# Patient Record
Sex: MALE | Race: WHITE | NOT HISPANIC OR LATINO | Employment: FULL TIME | URBAN - METROPOLITAN AREA
[De-identification: names, ages, dates, MRNs, and addresses within clinical notes are randomized per-mention and may not be internally consistent; named-entity substitution may affect disease eponyms.]

---

## 2020-07-29 ENCOUNTER — VIRTUAL VISIT (OUTPATIENT)
Dept: FAMILY MEDICINE | Facility: OTHER | Age: 45
End: 2020-07-29
Payer: COMMERCIAL

## 2020-07-29 PROCEDURE — 99421 OL DIG E/M SVC 5-10 MIN: CPT | Performed by: PHYSICIAN ASSISTANT

## 2020-07-29 NOTE — PROGRESS NOTES
"Date: 2020 11:36:41  Clinician: Adrian Duffy  Clinician NPI: [id number]  Patient: Sukhjinder Singletary  Patient : 1975  Patient Address: [address] [place] [address] [place] [address]  Patient Phone: [phone]  Visit Protocol: URI  Patient Summary:  Sukhjinder is a 44 year old ( : 1975 ) male who initiated a Visit for COVID-19 (Coronavirus) evaluation and screening. When asked the question \"Please sign me up to receive news, health information and promotions from [employer].\", Sukhjinder responded \"No\".    Sukhjinder states his symptoms started gradually 5-6 days ago. After his symptoms started, they improved and then got worse again.   His symptoms consist of wheezing, nausea, diarrhea, myalgia, a sore throat, a cough, nasal congestion, rhinitis, malaise, a headache, and chills. Sukhjinder also feels feverish.   Symptom details     Nasal secretions: The color of his mucus is clear, green, and yellow.    Cough: Sukhjinder coughs every 5-10 minutes and his cough is more bothersome at night. Phlegm comes into his throat when he coughs. He believes his cough is caused by post-nasal drip. The color of the phlegm is green.     Sore throat: Sukhjinder reports having moderate throat pain (4-6 on a 10 point pain scale), does not have exudate on his tonsils, and can swallow liquids. The lymph nodes in his neck are not enlarged. A rash has not appeared on the skin since the sore throat started.     Temperature: His current temperature is 100.1 degrees Fahrenheit. Sukhjinder has had a temperature over 100 degrees Fahrenheit for 3-4 days.     Wheezing: Sukhjinder has not ever been diagnosed with asthma. Additional wheezing details as reported by the patient (free text): Every time I wheeze, I then cough up phlegm       Headache: He states the headache is moderate (4-6 on a 10 point pain scale).      Sukhjinder denies having teeth pain, ageusia, anosmia, facial pain or pressure, vomiting, ear pain, and enlarged lymph nodes. He also denies having recent " facial or sinus surgery in the past 60 days and taking antibiotic medication in the past month. He is not experiencing dyspnea.   Precipitating events  Within the past week, Sukhjinder has not been exposed to someone with strep throat. He has not recently been exposed to someone with influenza. Sukhjinder has not been in close contact with any high risk individuals.   Pertinent COVID-19 (Coronavirus) information  In the past 14 days, Sukhjinder has not worked in a congregate living setting.   He either works or volunteers as a healthcare worker or a , or works or volunteers in a healthcare facility. He does not provide direct patient care. Additional job details as reported by the patient (free text): "[employer] for [employer].  I work in the following hospitals on a regular basis.  [facility]   Sukhjinder also has not lived in a congregate living setting in the past 14 days. He lives with a healthcare worker.   Sukhjinder has had a close contact with a laboratory-confirmed COVID-19 patient within 14 days of symptom onset. He was exposed at his work. Additional information about contact with COVID-19 (Coronavirus) patient as reported by the patient (free text): Within the last two weeks   Pertinent medical history  Sukhjinder had 1 sinus infection within the past year.   Sukhjinder needs a return to work/school note.   Weight: 340 lbs   Sukhjinder smokes or uses smokeless tobacco.   Weight: 340 lbs    MEDICATIONS: No current medications, ALLERGIES: NKDA  Clinician Response:  Dear Sukhjinder,   Your symptoms show that you may have coronavirus (COVID-19). This illness can cause fever, cough and trouble breathing. Many people get a mild case and get better on their own. Some people can get very sick.  What should I do?  We would like to test you for this virus.   1. Please call [phone] to schedule your visit. Explain that you were  "referred by [facility] to have a COVID-19 test. Be ready to share your [facility] visit ID number.  The following will serve as your written order for this COVID Test, ordered by me, for the indication of suspected COVID [Z20.828]: The test will be ordered in [employer], our electronic health record, after you are scheduled. It will show as ordered and authorized by Drew Siegel MD.  Order: COVID-19 (Coronavirus) PCR for SYMPTOMATIC testing from [facility].      2. When it's time for your COVID test:  Stay at least 6 feet away from others. (If someone will drive you to your test, stay in the backseat, as far away from the  as you can.)   Cover your mouth and nose with a mask, tissue or washcloth.  Go straight to the testing site. Don't make any stops on the way there or back.      3.Starting now: Stay home and away from others (self-isolate) until:   You've had no fever---and no medicine that reduces fever---for 3 full days (72 hours). And...   Your other symptoms have gotten better. For example, your cough or breathing has improved. And...   At least 10 days have passed since your symptoms started.       During this time, don't leave the house except for testing or medical care.   Stay in your own room, even for meals. Use your own bathroom if you can.   Stay away from others in your home. No hugging, kissing or shaking hands. No visitors.  Don't go to work, school or anywhere else.    Clean \"high touch\" surfaces often (doorknobs, counters, handles, etc.). Use a household cleaning spray or wipes. You'll find a full list of  on the EPA website: www.epa.gov/pesticide-registration/list-n-disinfectants-use-against-sars-cov-2.   Cover your mouth and nose with a mask, tissue or washcloth to avoid spreading germs.  Wash your hands and face often. Use soap and water.  Caregivers in these groups are at risk for severe illness due to COVID-19:  o People 65 years and older  o People who live in a nursing home or long-term care " facility  o People with chronic disease (lung, heart, cancer, diabetes, kidney, liver, immunologic)  o People who have a weakened immune system, including those who:   Are in cancer treatment  Take medicine that weakens the immune system, such as corticosteroids  Had a bone marrow or organ transplant  Have an immune deficiency  Have poorly controlled HIV or AIDS  Are obese (body mass index of 40 or higher)  Smoke regularly   o Caregivers should wear gloves while washing dishes, handling laundry and cleaning bedrooms and bathrooms.  o Use caution when washing and drying laundry: Don't shake dirty laundry, and use the warmest water setting that you can.  o For more tips, go to www.cdc.gov/coronavirus/2019-ncov/downloads/10Things.pdf.    4.Sign up for [employer]. We know it's scary to hear that you might have COVID-19. We want to track your symptoms to make sure you're okay over the next 2 weeks. Please look for an email from [employer]---this is a free, online program that we'll use to keep in touch. To sign up, follow the link in the email. Learn more at [email or web address]  How can I take care of myself?   Get lots of rest. Drink extra fluids (unless a doctor has told you not to).   Take Tylenol (acetaminophen) for fever or pain. If you have liver or kidney problems, ask your family doctor if it's okay to take Tylenol.   Adults can take either:    650 mg (two 325 mg pills) every 4 to 6 hours, or...   1,000 mg (two 500 mg pills) every 8 hours as needed.    Note: Don't take more than 3,000 mg in one day. Acetaminophen is found in many medicines (both prescribed and over-the-counter medicines). Read all labels to be sure you don't take too much.   For children, check the Tylenol bottle for the right dose. The dose is based on the child's age or weight.    If you have other health problems (like cancer, heart failure, an organ transplant or severe kidney disease): Call your specialty clinic if you  don't feel better in the next 2 days.       Know when to call 911. Emergency warning signs include:    Trouble breathing or shortness of breath Pain or pressure in the chest that doesn't go away Feeling confused like you haven't felt before, or not being able to wake up Bluish-colored lips or face.  Where can I get more information?   [facility] -- About COVID-19: [email or web address]   CDC -- What to Do If You're Sick: www.cdc.gov/coronavirus/2019-ncov/about/steps-when-sick.html   CDC -- Ending Home Isolation: www.cdc.gov/coronavirus/2019-ncov/hcp/disposition-in-home-patients.html   CDC -- Caring for Someone: www.cdc.gov/coronavirus/2019-ncov/if-you-are-sick/care-for-someone.html   [facility] -- Interim Guidance for Hospital Discharge to Home: www.[facility].[place]./diseases/coronavirus/hcp/hospdischarge.pdf   [facility] clinical trials (COVID-19 research studies): clinicalaffairs.[facility]-clinical-trials    Below are the COVID-19 hotlines at the [facility] ([facility]). Interpreters are available.    For health questions: Call 381-241-2986 or 1-658.622.1640 (7 a.m. to 7 p.m.) For questions about schools and childcare: Call 106-842-1767 or 1-665.456.2802 (7 a.m. to 7 p.m.)    Diagnosis: Pain in throat  Diagnosis ICD: R07.0

## 2020-07-30 DIAGNOSIS — Z20.822 SUSPECTED COVID-19 VIRUS INFECTION: Primary | ICD-10-CM

## 2020-07-30 PROCEDURE — U0003 INFECTIOUS AGENT DETECTION BY NUCLEIC ACID (DNA OR RNA); SEVERE ACUTE RESPIRATORY SYNDROME CORONAVIRUS 2 (SARS-COV-2) (CORONAVIRUS DISEASE [COVID-19]), AMPLIFIED PROBE TECHNIQUE, MAKING USE OF HIGH THROUGHPUT TECHNOLOGIES AS DESCRIBED BY CMS-2020-01-R: HCPCS | Performed by: FAMILY MEDICINE

## 2020-07-31 LAB
SARS-COV-2 RNA SPEC QL NAA+PROBE: NOT DETECTED
SPECIMEN SOURCE: NORMAL

## 2020-08-27 ENCOUNTER — TRANSFERRED RECORDS (OUTPATIENT)
Dept: PHYSICAL THERAPY | Facility: CLINIC | Age: 45
End: 2020-08-27

## 2021-01-15 ENCOUNTER — HEALTH MAINTENANCE LETTER (OUTPATIENT)
Age: 46
End: 2021-01-15

## 2021-03-18 ENCOUNTER — IMMUNIZATION (OUTPATIENT)
Dept: PEDIATRICS | Facility: CLINIC | Age: 46
End: 2021-03-18
Payer: COMMERCIAL

## 2021-03-18 PROCEDURE — 0001A PR COVID VAC PFIZER DIL RECON 30 MCG/0.3 ML IM: CPT

## 2021-03-18 PROCEDURE — 91300 PR COVID VAC PFIZER DIL RECON 30 MCG/0.3 ML IM: CPT

## 2021-04-08 ENCOUNTER — IMMUNIZATION (OUTPATIENT)
Dept: PEDIATRICS | Facility: CLINIC | Age: 46
End: 2021-04-08
Attending: INTERNAL MEDICINE
Payer: COMMERCIAL

## 2021-04-08 PROCEDURE — 0002A PR COVID VAC PFIZER DIL RECON 30 MCG/0.3 ML IM: CPT

## 2021-04-08 PROCEDURE — 91300 PR COVID VAC PFIZER DIL RECON 30 MCG/0.3 ML IM: CPT

## 2021-10-24 ENCOUNTER — HEALTH MAINTENANCE LETTER (OUTPATIENT)
Age: 46
End: 2021-10-24

## 2022-02-13 ENCOUNTER — HEALTH MAINTENANCE LETTER (OUTPATIENT)
Age: 47
End: 2022-02-13

## 2022-10-15 ENCOUNTER — HEALTH MAINTENANCE LETTER (OUTPATIENT)
Age: 47
End: 2022-10-15

## 2022-12-13 ENCOUNTER — OFFICE VISIT (OUTPATIENT)
Dept: URGENT CARE | Facility: URGENT CARE | Age: 47
End: 2022-12-13
Payer: COMMERCIAL

## 2022-12-13 VITALS
HEART RATE: 95 BPM | HEIGHT: 75 IN | BODY MASS INDEX: 39.17 KG/M2 | OXYGEN SATURATION: 97 % | TEMPERATURE: 100.1 F | WEIGHT: 315 LBS | DIASTOLIC BLOOD PRESSURE: 69 MMHG | SYSTOLIC BLOOD PRESSURE: 115 MMHG

## 2022-12-13 DIAGNOSIS — J11.1 INFLUENZA-LIKE ILLNESS: Primary | ICD-10-CM

## 2022-12-13 LAB
FLUAV AG SPEC QL IA: NEGATIVE
FLUBV AG SPEC QL IA: NEGATIVE

## 2022-12-13 PROCEDURE — 87804 INFLUENZA ASSAY W/OPTIC: CPT | Performed by: PHYSICIAN ASSISTANT

## 2022-12-13 PROCEDURE — 99203 OFFICE O/P NEW LOW 30 MIN: CPT | Performed by: PHYSICIAN ASSISTANT

## 2022-12-13 RX ORDER — OSELTAMIVIR PHOSPHATE 75 MG/1
75 CAPSULE ORAL 2 TIMES DAILY
Qty: 10 CAPSULE | Refills: 0 | Status: SHIPPED | OUTPATIENT
Start: 2022-12-13 | End: 2022-12-18

## 2022-12-13 RX ORDER — MULTIVITAMIN/IRON/FOLIC ACID 18MG-0.4MG
1 TABLET ORAL DAILY
COMMUNITY

## 2022-12-13 RX ORDER — BENZONATATE 200 MG/1
200 CAPSULE ORAL 3 TIMES DAILY PRN
Qty: 30 CAPSULE | Refills: 0 | Status: SHIPPED | OUTPATIENT
Start: 2022-12-13 | End: 2022-12-23

## 2022-12-13 ASSESSMENT — ENCOUNTER SYMPTOMS
COUGH: 1
ARTHRALGIAS: 1
CHEST TIGHTNESS: 0
SINUS PRESSURE: 0
GASTROINTESTINAL NEGATIVE: 1
CHILLS: 1
SINUS PAIN: 0
FEVER: 1
SORE THROAT: 0
PALPITATIONS: 0
FATIGUE: 1
WHEEZING: 0
RHINORRHEA: 1
CARDIOVASCULAR NEGATIVE: 1
SHORTNESS OF BREATH: 1

## 2022-12-13 NOTE — PROGRESS NOTES
"  Vinnie Obando is a 47 year old, presenting for the following health issues:  Headache, Fever, Cough, Chills, Fatigue, and Running Nose    HPI   Acute Illness  Acute illness concerns:   Onset/Duration: 2days  Symptoms:  Fever: YES  Chills/Sweats: YES  Headache (location?): Yes  Sinus Pressure: No  Conjunctivitis:  No  Ear Pain: no  Rhinorrhea: YES  Congestion: YES  Sore Throat: No  Cough: YES-productive of yellow sputum, with shortness of breath  Wheeze: No  Decreased Appetite: No  Nausea: No  Vomiting: No  Diarrhea: No  Dysuria/Freq.: No  Dysuria or Hematuria: No  Fatigue/Achiness: YES  Sick/Strep Exposure: YES- at home  Therapies tried and outcome: rest, fluids, tylenol, motrin with minimal relief    There is no problem list on file for this patient.    Current Outpatient Medications   Medication     multivitamin w/minerals (CENTRUM ADULTS) tablet     erythromycin (ROMYCIN) ophthalmic ointment     NO ACTIVE MEDICATIONS     ofloxacin (OCUFLOX) 0.3 % ophthalmic solution     oxyCODONE-acetaminophen (PERCOCET) 5-325 MG per tablet     No current facility-administered medications for this visit.        Allergies   Allergen Reactions     Codeine Nausea     Hydrocodone-Acetaminophen Nausea       Review of Systems   Constitutional: Positive for chills, fatigue and fever.   HENT: Positive for congestion and rhinorrhea. Negative for ear discharge, ear pain, hearing loss, sinus pressure, sinus pain and sore throat.    Respiratory: Positive for cough and shortness of breath. Negative for chest tightness and wheezing.    Cardiovascular: Negative.  Negative for chest pain, palpitations and peripheral edema.   Gastrointestinal: Negative.    Musculoskeletal: Positive for arthralgias.   All other systems reviewed and are negative.           Objective    /69 (BP Location: Left arm, Patient Position: Sitting, Cuff Size: Adult Large)   Pulse 95   Temp 100.1  F (37.8  C) (Tympanic)   Ht 1.905 m (6' 3\")   Wt (!) 151.9 kg " (334 lb 14.4 oz)   SpO2 97%   BMI 41.86 kg/m    Body mass index is 41.86 kg/m .  Physical Exam  Vitals and nursing note reviewed.   Constitutional:       General: He is not in acute distress.     Appearance: Normal appearance. He is normal weight. He is not ill-appearing.   HENT:      Head: Normocephalic and atraumatic.      Ears:      Comments: TMs are intact without any erythema or bulging bilaterally.  Airway is patent.     Nose: Nose normal.      Mouth/Throat:      Lips: Pink.      Mouth: Mucous membranes are moist.      Pharynx: Oropharynx is clear. Uvula midline. No pharyngeal swelling, oropharyngeal exudate, posterior oropharyngeal erythema or uvula swelling.      Tonsils: No tonsillar exudate or tonsillar abscesses.   Eyes:      General: No scleral icterus.     Conjunctiva/sclera: Conjunctivae normal.      Pupils: Pupils are equal, round, and reactive to light.   Neck:      Thyroid: No thyromegaly.   Cardiovascular:      Rate and Rhythm: Normal rate and regular rhythm.      Pulses: Normal pulses.      Heart sounds: Normal heart sounds, S1 normal and S2 normal. No murmur heard.    No friction rub. No gallop.   Pulmonary:      Effort: Pulmonary effort is normal. No tachypnea, accessory muscle usage, respiratory distress or retractions.      Breath sounds: Normal breath sounds and air entry. No stridor. No decreased breath sounds, wheezing, rhonchi or rales.   Musculoskeletal:      Cervical back: Normal range of motion and neck supple.   Lymphadenopathy:      Cervical: No cervical adenopathy.   Skin:     General: Skin is warm and dry.      Findings: No rash.   Neurological:      Mental Status: He is alert and oriented to person, place, and time.   Psychiatric:         Mood and Affect: Mood normal.         Behavior: Behavior normal.         Thought Content: Thought content normal.         Judgment: Judgment normal.       Results for orders placed or performed in visit on 12/13/22 (from the past 24 hour(s))    Influenza A & B Antigen - Clinic Collect    Specimen: Nose; Swab   Result Value Ref Range    Influenza A antigen Negative Negative    Influenza B antigen Negative Negative    Narrative    Test results must be correlated with clinical data. If necessary, results should be confirmed by a molecular assay or viral culture.       Assessment/Plan:  Influenza-like illness:  Rapid influenza was negative.  Home covid was also negative.  Will empirically treat with [id number] and give tessalon perles as needed for cough.  Recommend tylenol/ibuprofen prn pain/fever.  He in considered contagious until he has been fever free for at least 24hours without the use of antipyretics.  Cover coughs, sneezes and wash hands.  Rest, fluids, chicken soup.  Recheck in clinic if symptoms worsen or if symptoms do not improve.  To the ER  he develops hemoptysis, shortness of breath/wheezing, chest pain, stiff neck or fevers >102.  -     Influenza A & B Antigen - Clinic Collect  -     oseltamivir (TAMIFLU) 75 MG capsule; Take 1 capsule (75 mg) by mouth 2 times daily for 5 days  -     benzonatate (TESSALON) 200 MG capsule; Take 1 capsule (200 mg) by mouth 3 times daily as needed for cough        Sandra Yanes PA-C

## 2023-03-26 ENCOUNTER — HEALTH MAINTENANCE LETTER (OUTPATIENT)
Age: 48
End: 2023-03-26

## 2024-05-26 ENCOUNTER — HEALTH MAINTENANCE LETTER (OUTPATIENT)
Age: 49
End: 2024-05-26

## 2024-12-23 ENCOUNTER — ANCILLARY PROCEDURE (OUTPATIENT)
Dept: GENERAL RADIOLOGY | Facility: CLINIC | Age: 49
End: 2024-12-23
Attending: EMERGENCY MEDICINE
Payer: COMMERCIAL

## 2024-12-23 ENCOUNTER — OFFICE VISIT (OUTPATIENT)
Dept: URGENT CARE | Facility: URGENT CARE | Age: 49
End: 2024-12-23
Payer: COMMERCIAL

## 2024-12-23 VITALS
SYSTOLIC BLOOD PRESSURE: 115 MMHG | BODY MASS INDEX: 38.78 KG/M2 | DIASTOLIC BLOOD PRESSURE: 72 MMHG | RESPIRATION RATE: 14 BRPM | HEART RATE: 77 BPM | OXYGEN SATURATION: 96 % | WEIGHT: 310.3 LBS | TEMPERATURE: 98 F

## 2024-12-23 DIAGNOSIS — J40 BRONCHITIS WITH ACUTE WHEEZING: Primary | ICD-10-CM

## 2024-12-23 DIAGNOSIS — R09.1 PLEURITIS: ICD-10-CM

## 2024-12-23 DIAGNOSIS — J06.9 UPPER RESPIRATORY TRACT INFECTION, UNSPECIFIED TYPE: ICD-10-CM

## 2024-12-23 PROCEDURE — 99214 OFFICE O/P EST MOD 30 MIN: CPT | Performed by: EMERGENCY MEDICINE

## 2024-12-23 PROCEDURE — 71046 X-RAY EXAM CHEST 2 VIEWS: CPT | Mod: TC | Performed by: RADIOLOGY

## 2024-12-23 RX ORDER — CETIRIZINE HYDROCHLORIDE 10 MG/1
10 TABLET ORAL
COMMUNITY
Start: 2024-12-17 | End: 2025-12-17

## 2024-12-23 RX ORDER — BENZONATATE 100 MG/1
100 CAPSULE ORAL 3 TIMES DAILY PRN
Qty: 25 CAPSULE | Refills: 0 | Status: SHIPPED | OUTPATIENT
Start: 2024-12-23

## 2024-12-23 RX ORDER — ALBUTEROL SULFATE 90 UG/1
2 INHALANT RESPIRATORY (INHALATION) EVERY 6 HOURS PRN
Qty: 18 G | Refills: 0 | Status: SHIPPED | OUTPATIENT
Start: 2024-12-23

## 2024-12-23 RX ORDER — ADALIMUMAB 40MG/0.4ML
0.4 KIT SUBCUTANEOUS
COMMUNITY
Start: 2024-07-16

## 2024-12-23 RX ORDER — ALLOPURINOL 100 MG/1
100 TABLET ORAL
COMMUNITY
Start: 2024-12-17

## 2024-12-23 NOTE — PROGRESS NOTES
"Assessment & Plan     Diagnosis:    ICD-10-CM    1. Bronchitis with acute wheezing  J40 XR Chest 2 Views     benzonatate (TESSALON) 100 MG capsule     albuterol (PROAIR HFA/PROVENTIL HFA/VENTOLIN HFA) 108 (90 Base) MCG/ACT inhaler      2. Pleuritis  R09.1           Medical Decision Making:  Sukhjinder Singletary is a 49 year old male who presents for evaluation of cough, chest wall pain.  This is consistent with an upper respiratory tract infection.  There is no signs at this point of serious bacterial infection such as OM, RPA, epiglottitis, PTA, strep pharyngitis.    Given patient is on Humira and concerned for pneumonia , I did a CXR to [facility]. This shows no acute infiltrate or effusion. There are no signs of complications of URI/bronchitis at this time such as hypoxia, respiratory failure or compromise.  He is having some chest pain with inspiration and coughing, seems to be chest wall related is reproducible.      Also discussed this could represent pleuritis given the pain worse with deep breathing, coughing and response to NSAIDs. Will monitor closely and if worsening pain, especially exertional or radiating, or associated with shortness of breath, leg swelling/calf pain, or other concerns patient will go immediately to the ER for further evaluation. Patient verbalizes understanding and agreement with the plan including reasons to go to the ER. All questions answered.     Neymar Lomax PA-C  [facility] URGENT CARE    Subjective     Sukhjinder Singletary is a 49 year old male who presents to clinic today for the following health issues:  Chief Complaint   Patient presents with    Urgent Care    Cough     Productive cough started on Saturday, have this \"odd\" sensation in lungs for couple week, extreme chest congestion and today it really hurt, HX pneumonia, Had both Tylenol at 7 and ibuprofen at 9 this morning, older son recently had pneumonia        HPI  Productive cough for the past 2 days, kids and wife are " sick at home with pneumonia recently.  Patient is on Humira and wanted to be checked out early.  He does  note that he is maybe had an odd sensation in his  lungs for a couple of weeks, but no pain, cough, congestion etc.  He does not feel short of breath.  Notes that the pain worsened a little bit this morning, especially with coughing.  Taking in a deep breath does irritated a little bit as well.  He notes no difficulty swallowing or breathing, sore throat, fevers, leg pain or swelling, history of blood clots in the legs or lungs, exertional chest pain, abdominal pain, nausea, vomiting or other concerns.    Review of Systems    See HPI    Objective      Vitals: /72 (BP Location: Left arm, Patient Position: Sitting, Cuff Size: Adult Large)   Pulse 77   Temp 98  F (36.7  C) (Oral)   Resp 14   Wt 140.8 kg (310 lb 4.8 oz)   SpO2 96%   BMI 38.78 kg/m        Patient Vitals for the past 24 hrs:   BP Temp Temp src Pulse Resp SpO2 Weight   12/23/24 1028 115/72 98  F (36.7  C) Oral 77 14 96 % 140.8 kg (310 lb 4.8 oz)       Vital signs reviewed by: Neymar Lomax PA-C    Physical Exam   Constitutional: Patient is alert and cooperative. No acute distress.  Ears: Right TM is normal. Left TM is normal. External ear canals are normal.  Mouth: Mucous membranes are moist. Normal tongue and tonsil. Posterior oropharynx is clear.  Eyes: Conjunctivae, EOMI and lids are normal.  Cardiovascular: Regular rate and rhythm  Pulmonary/Chest: Lungs are clear to auscultation throughout. Effort normal. No respiratory distress. No wheezes, rales or rhonchi.  Neurological: Alert and oriented x3.   Skin: No rash noted on visualized skin.  Psychiatric:The patient has a normal mood and affect.     Labs/Imaging:  Results for orders placed or performed in visit on 12/23/24   XR Chest 2 Views     Status: None (Preliminary result)    Narrative    CHEST TWO VIEWS December 23, 2024 10:40 AM     HISTORY: Upper respiratory tract infection,  unspecified type.    COMPARISON: None.      Impression    IMPRESSION: No acute cardiopulmonary disease.     Reading per radiology    Neymar Lomax PA-C, December 23, 2024

## 2025-03-22 ENCOUNTER — HEALTH MAINTENANCE LETTER (OUTPATIENT)
Age: 50
End: 2025-03-22

## 2025-05-12 ENCOUNTER — OFFICE VISIT (OUTPATIENT)
Dept: URGENT CARE | Facility: URGENT CARE | Age: 50
End: 2025-05-12
Payer: COMMERCIAL

## 2025-05-12 VITALS
DIASTOLIC BLOOD PRESSURE: 79 MMHG | RESPIRATION RATE: 18 BRPM | HEART RATE: 65 BPM | WEIGHT: 306 LBS | SYSTOLIC BLOOD PRESSURE: 127 MMHG | BODY MASS INDEX: 38.25 KG/M2 | TEMPERATURE: 98.1 F | OXYGEN SATURATION: 95 %

## 2025-05-12 DIAGNOSIS — R07.89 CHEST TIGHTNESS: Primary | ICD-10-CM

## 2025-05-12 PROCEDURE — 3078F DIAST BP <80 MM HG: CPT

## 2025-05-12 PROCEDURE — 3074F SYST BP LT 130 MM HG: CPT

## 2025-05-12 PROCEDURE — 1126F AMNT PAIN NOTED NONE PRSNT: CPT

## 2025-05-12 PROCEDURE — 99213 OFFICE O/P EST LOW 20 MIN: CPT

## 2025-05-12 PROCEDURE — 93000 ELECTROCARDIOGRAM COMPLETE: CPT

## 2025-05-12 ASSESSMENT — PAIN SCALES - GENERAL: PAINLEVEL_OUTOF10: NO PAIN (0)

## 2025-05-12 NOTE — PROGRESS NOTES
Urgent Care Clinic Visit  Rapid rooming was initiated.  Provider was consulted, patient will remain in room and provider will be with patient as soon as possible.  Chief Complaint   Patient presents with    Chest Pain     Arm tingling/pain, shortness of breath, chest tightness - ongoing for a month was seen by primary and referred to cardio but has not gotten in and sx are worsening                5/12/2025    11:12 AM   Additional Questions   Roomed by Nunu   Accompanied by Self

## 2025-05-12 NOTE — PROGRESS NOTES
Assessment & Plan   (R07.89) Chest tightness  (primary encounter diagnosis)  Plan: EKG 12-lead complete w/read - Clinics    Informed the patient that the EKG in the clinic today does not show a heart attack or irregular rhythm.  We discussed the need to continue to explore the full cardiac workup but that his symptoms are likely related to environmental triggers given this occurs when he is outside and his report of this worsening more recently with the poor air quality and pollen in the air.  We also discussed using his albuterol as previously prescribed and continue to take his aspirin daily.  Informed the patient to follow-up with cardiology as planned and go to the emergency department with any new or worsening symptoms.  Patient acknowledged his understanding of the above plan.    The use of Dragon/[employer] services may have been used to construct the content in this note; any grammatical or spelling errors are non-intentional. Please contact the author of this note directly if you are in need of any clarification.      TUSHAR [facility] URGENT CARE APRILJOHN Obando is a 49 year old male who presents to clinic today for the following health issues:  Chief Complaint   Patient presents with    Chest Pain     Arm tingling/pain, shortness of breath, chest tightness - ongoing for a month was seen by primary and referred to cardio but has not gotten in and sx are worsening      HPI  Patient reports having intermittent chest tightness, shortness of breath and heart racing mainly with activity.  He states this has been for a month or more.  The patient reports this has gotten worse over the past couple weeks.  He indicates that the albuterol helps with the breathing.  He believes symptoms could be related to the pollen and poor air quality more recently.  He has been taking an aspirin a day since his primary care appointment in March.     ROS:  Negative  except noted above.    Review of Systems        Objective    /79 (BP Location: Left arm, Patient Position: Sitting, Cuff Size: Adult Large)   Pulse 65   Temp 98.1  F (36.7  C) (Oral)   Resp 18   Wt (!) 138.8 kg (306 lb)   SpO2 95%   BMI 38.25 kg/m    Physical Exam   GENERAL: alert and no distress  EYES: Eyes grossly normal to inspection, PERRL and conjunctivae and sclerae normal  HENT: nose and mouth without ulcers or lesions, oropharynx clear, and oral mucous membranes moist  RESP: lungs clear to auscultation - no rales, rhonchi or wheezes  CV: regular rate and rhythm, normal S1 S2, no S3 or S4, no murmur, click or rub, no peripheral edema  MS: no gross musculoskeletal defects noted, no edema  SKIN: no suspicious lesions or rashes  NEURO: Normal strength and tone, mentation intact and speech normal  PSYCH: mentation appears normal, affect normal/bright

## 2025-05-12 NOTE — PATIENT INSTRUCTIONS
EKG in the clinic today does not show a heart attack or irregular rhythm.  Continue taking your aspirin a day.  Use your albuterol as previously prescribed.  Follow up with cardiology as planned.  Go to the emergency department with any new or worsening symptoms.